# Patient Record
Sex: MALE | URBAN - METROPOLITAN AREA
[De-identification: names, ages, dates, MRNs, and addresses within clinical notes are randomized per-mention and may not be internally consistent; named-entity substitution may affect disease eponyms.]

---

## 2022-09-19 ENCOUNTER — OFFICE VISIT (OUTPATIENT)
Dept: URBAN - METROPOLITAN AREA CLINIC 6 | Facility: CLINIC | Age: 62
End: 2022-09-19
Payer: MEDICARE

## 2022-09-19 DIAGNOSIS — H35.413 LATTICE DEGENERATION OF RETINA, BILATERAL: ICD-10-CM

## 2022-09-19 DIAGNOSIS — H40.053 OCULAR HYPERTENSION, BILATERAL: ICD-10-CM

## 2022-09-19 DIAGNOSIS — H04.123 TEAR FILM INSUFFICIENCY OF BILATERAL LACRIMAL GLANDS: ICD-10-CM

## 2022-09-19 DIAGNOSIS — H20.011 PRIMARY IRIDOCYCLITIS OF RIGHT EYE: ICD-10-CM

## 2022-09-19 PROCEDURE — 92250 FUNDUS PHOTOGRAPHY W/I&R: CPT | Performed by: OPTOMETRIST

## 2022-09-19 PROCEDURE — 92004 COMPRE OPH EXAM NEW PT 1/>: CPT | Performed by: OPTOMETRIST

## 2022-09-19 RX ORDER — PREDNISOLONE ACETATE 10 MG/ML
1 % SUSPENSION/ DROPS OPHTHALMIC
Qty: 5 | Refills: 0 | Status: ACTIVE
Start: 2022-09-19

## 2022-09-19 RX ORDER — DORZOLAMIDE HYDROCHLORIDE AND TIMOLOL MALEATE 20; 5 MG/ML; MG/ML
SOLUTION/ DROPS OPHTHALMIC
Qty: 10 | Refills: 4 | Status: ACTIVE
Start: 2022-09-19

## 2022-09-19 ASSESSMENT — INTRAOCULAR PRESSURE
OD: 29
OS: 20

## 2022-09-19 NOTE — IMPRESSION/PLAN
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 Plan: Iritis w/hypema: Primary episode and does not recall any trauma. Start pred QID OD (shake very well). Return immediately for increased pain, increased redness, or decreased vision. Try to sleep with head elevated.

## 2022-09-19 NOTE — IMPRESSION/PLAN
Impression: Tear film insufficiency of bilateral lacrimal glands: H04.123. Plan: Dry eyes - Recommend use of high quality artificial tears 3-4x per day.

## 2022-09-19 NOTE — IMPRESSION/PLAN
Impression: Lattice degeneration of retina, bilateral: H35.413. Plan: Lattice degeneration both eyes- lattice and the increased risk of retinal tears and detachment discussed. Retinal detachment symptoms discussed, and patient agreed to return immediately if new symptoms develop.

## 2022-09-19 NOTE — IMPRESSION/PLAN
Impression: Ocular hypertension, bilateral: H40.053. Plan: Ocular Hypertension OD>OS - likely due to iritis .  Start Cosopt BID OD

## 2022-09-22 ENCOUNTER — OFFICE VISIT (OUTPATIENT)
Dept: URBAN - METROPOLITAN AREA CLINIC 6 | Facility: CLINIC | Age: 62
End: 2022-09-22
Payer: MEDICARE

## 2022-09-22 DIAGNOSIS — H25.813 COMBINED FORMS OF AGE-RELATED CATARACT, BILATERAL: ICD-10-CM

## 2022-09-22 PROCEDURE — 92012 INTRM OPH EXAM EST PATIENT: CPT | Performed by: OPTOMETRIST

## 2022-09-22 ASSESSMENT — INTRAOCULAR PRESSURE
OD: 30
OS: 19

## 2022-09-22 NOTE — IMPRESSION/PLAN
Impression: Ocular hypertension, bilateral: H40.053. Plan: Ocular Hypertension OD>OS - likely due to iritis .   Increase Cosopt to TID OD

## 2022-09-22 NOTE — IMPRESSION/PLAN
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 Plan: Iritis w/hypema:  Increase prednisolone acetate to q2h in the right eye (shake very well). Return immediately for increased pain, increased redness, or decreased vision. Try to sleep with head elevated.

## 2022-09-29 ENCOUNTER — OFFICE VISIT (OUTPATIENT)
Dept: URBAN - METROPOLITAN AREA CLINIC 6 | Facility: CLINIC | Age: 62
End: 2022-09-29
Payer: MEDICARE

## 2022-09-29 DIAGNOSIS — H20.011 PRIMARY IRIDOCYCLITIS OF RIGHT EYE: Primary | ICD-10-CM

## 2022-09-29 DIAGNOSIS — H25.813 COMBINED FORMS OF AGE-RELATED CATARACT, BILATERAL: ICD-10-CM

## 2022-09-29 PROCEDURE — 92012 INTRM OPH EXAM EST PATIENT: CPT | Performed by: OPTOMETRIST

## 2022-09-29 ASSESSMENT — INTRAOCULAR PRESSURE
OS: 15
OD: 15

## 2022-09-29 NOTE — IMPRESSION/PLAN
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 Plan: Iritis w/hypema: Significant improvement. Use pred twice a day until runs out. Use combigan twice a day until runs out. Recheck OU dilated in 3 months.